# Patient Record
Sex: FEMALE | Race: WHITE | NOT HISPANIC OR LATINO | Employment: OTHER | URBAN - METROPOLITAN AREA
[De-identification: names, ages, dates, MRNs, and addresses within clinical notes are randomized per-mention and may not be internally consistent; named-entity substitution may affect disease eponyms.]

---

## 2023-05-03 ENCOUNTER — TRANSFERRED RECORDS (OUTPATIENT)
Dept: HEALTH INFORMATION MANAGEMENT | Facility: CLINIC | Age: 73
End: 2023-05-03
Payer: COMMERCIAL

## 2023-05-17 ENCOUNTER — TRANSFERRED RECORDS (OUTPATIENT)
Dept: HEALTH INFORMATION MANAGEMENT | Facility: CLINIC | Age: 73
End: 2023-05-17
Payer: COMMERCIAL

## 2023-05-17 ENCOUNTER — DOCUMENTATION ONLY (OUTPATIENT)
Dept: INTERNAL MEDICINE | Facility: CLINIC | Age: 73
End: 2023-05-17
Payer: COMMERCIAL

## 2023-05-17 NOTE — PROGRESS NOTES
Type of Form Received:     Form Received (Date) 5/17/23   Form Filled out No   Placed in provider folder Yes

## 2023-05-24 ENCOUNTER — TELEPHONE (OUTPATIENT)
Dept: INTERNAL MEDICINE | Facility: CLINIC | Age: 73
End: 2023-05-24

## 2023-05-24 NOTE — TELEPHONE ENCOUNTER
"Received fax from Quinten Conrad () at [facility]. Fax contained medical records for patient.     Patient was supposed to have an [facility] care virtual visit with Dr. Dickens on 5/23/23 at 10:30 am.     Received the following message from the call center:     \"From: Mikala Grajeda   Sent: 5/23/2023  10:35 AM CDT   To: Clinic [facility]   Subject: Reschedule visit                                 Good Morning,     Libra is in the system as a male, but pt is a female.     She is non verbal.  Her appt today was scheduled by the hospital upon her discharge and admittance into nursing home.  Olivia, at the nursing home, asked that the forms be sent to get consent to communicate, etc. and that today's appt be rescheduled.  The nursing home was not even aware of this appt.     Dr. Dickens is aware and approved this. She was schedule to see her today at 10:30 am.     ThanksMikala\"      Unsure who set up the appointment or the patient. Unsure why Dr. Dickens is listed as PCP or how [facility] determined our clinic to be the one to follow up on the patient's care.     Clinic needs to know how Dr. Dickens was determined to be follow up provider as the patient has never been seen by [facility]. Clinic also needs to know specifically what forms are needed and where to send them. If applicable, the clinic needs copies of any notarized health care directives or legal guardian documents.     Called Quinten Conrad  (Phone: [phone] or [phone]; Fax: [phone]). Left a message with Quinten Martinez will give clinic a call back.     Catalino KRISHNAN, EMT at 10:34 AM on 5/24/2023.  Primary Care Clinic: [phone]    "

## 2023-05-25 NOTE — TELEPHONE ENCOUNTER
Called Olivia with [facility] [phone].     LVM. Line was not confidential, nor did it indicate it belonged to Olivia with [facility]. Did not leave any patient information, encouraged Olivia to call the clinic back and ask for Kacey Hyatt contact information per their website:     [facility] [address] [id number] [address], [place] [address], [facility]    Phone: [phone]  Email: [email or web address]    Business Hours:  Monday-Friday: 8:30 am - 5:00 pm      MARY Coreas at 9:40 AM on 5/25/2023.  Primary Care Clinic: [phone]

## 2023-05-25 NOTE — TELEPHONE ENCOUNTER
Called and spoke with Quinten. Asked about patient. Patient is unable to visit with a virtual location in [place] due to a previous restraining order and insurance issues? Quinten was rushed on the phone and did not specify location or details.     Quinten explained that the patient will only be able to do virtual appointments which is how the patient was scheduled with our clinic.     Quinten stated the patient's nursing home was given all of the information about the appointment and next steps. Quinten is upset with the nursing home and will be contacting Olivia. Quinten gave us the contact information of Olivia at the nursing home.     [facility] unit [phone]    Catalino KRISHNAN, EMT at 9:23 AM on 5/25/2023.  Primary Care Clinic: [phone]